# Patient Record
Sex: MALE | Race: BLACK OR AFRICAN AMERICAN | NOT HISPANIC OR LATINO | Employment: UNEMPLOYED | URBAN - METROPOLITAN AREA
[De-identification: names, ages, dates, MRNs, and addresses within clinical notes are randomized per-mention and may not be internally consistent; named-entity substitution may affect disease eponyms.]

---

## 2019-03-21 ENCOUNTER — HOSPITAL ENCOUNTER (EMERGENCY)
Facility: HOSPITAL | Age: 59
Discharge: HOME | End: 2019-03-21
Attending: EMERGENCY MEDICINE | Admitting: EMERGENCY MEDICINE

## 2019-03-21 ENCOUNTER — APPOINTMENT (OUTPATIENT)
Dept: GENERAL RADIOLOGY | Facility: HOSPITAL | Age: 59
End: 2019-03-21

## 2019-03-21 VITALS
BODY MASS INDEX: 23.8 KG/M2 | TEMPERATURE: 98.8 F | DIASTOLIC BLOOD PRESSURE: 70 MMHG | SYSTOLIC BLOOD PRESSURE: 124 MMHG | HEART RATE: 80 BPM | RESPIRATION RATE: 18 BRPM | WEIGHT: 170 LBS | OXYGEN SATURATION: 98 % | HEIGHT: 71 IN

## 2019-03-21 DIAGNOSIS — Z86.79 HISTORY OF HYPERTENSION: ICD-10-CM

## 2019-03-21 DIAGNOSIS — I50.9 CONGESTIVE HEART FAILURE, UNSPECIFIED HF CHRONICITY, UNSPECIFIED HEART FAILURE TYPE (HCC): Primary | ICD-10-CM

## 2019-03-21 LAB
ALBUMIN SERPL-MCNC: 3.9 G/DL (ref 3.2–4.8)
ALBUMIN/GLOB SERPL: 1.3 G/DL (ref 1.5–2.5)
ALP SERPL-CCNC: 80 U/L (ref 25–100)
ALT SERPL W P-5'-P-CCNC: 35 U/L (ref 7–40)
ANION GAP SERPL CALCULATED.3IONS-SCNC: 8 MMOL/L (ref 3–11)
AST SERPL-CCNC: 26 U/L (ref 0–33)
BASOPHILS # BLD AUTO: 0.02 10*3/MM3 (ref 0–0.2)
BASOPHILS NFR BLD AUTO: 0.3 % (ref 0–1)
BILIRUB SERPL-MCNC: 1.4 MG/DL (ref 0.3–1.2)
BNP SERPL-MCNC: 675 PG/ML (ref 0–100)
BUN BLD-MCNC: 14 MG/DL (ref 9–23)
BUN/CREAT SERPL: 11.7 (ref 7–25)
CALCIUM SPEC-SCNC: 8.9 MG/DL (ref 8.7–10.4)
CHLORIDE SERPL-SCNC: 111 MMOL/L (ref 99–109)
CO2 SERPL-SCNC: 21 MMOL/L (ref 20–31)
CREAT BLD-MCNC: 1.2 MG/DL (ref 0.6–1.3)
D DIMER PPP FEU-MCNC: 0.48 MCGFEU/ML (ref 0–0.56)
D-LACTATE SERPL-SCNC: 1.1 MMOL/L (ref 0.5–2)
DEPRECATED RDW RBC AUTO: 49.8 FL (ref 37–54)
EOSINOPHIL # BLD AUTO: 0.28 10*3/MM3 (ref 0–0.3)
EOSINOPHIL NFR BLD AUTO: 4.1 % (ref 0–3)
ERYTHROCYTE [DISTWIDTH] IN BLOOD BY AUTOMATED COUNT: 15.2 % (ref 11.3–14.5)
GFR SERPL CREATININE-BSD FRML MDRD: 76 ML/MIN/1.73
GLOBULIN UR ELPH-MCNC: 2.9 GM/DL
GLUCOSE BLD-MCNC: 95 MG/DL (ref 70–100)
HCT VFR BLD AUTO: 38 % (ref 38.9–50.9)
HGB BLD-MCNC: 12.6 G/DL (ref 13.1–17.5)
HOLD SPECIMEN: NORMAL
HOLD SPECIMEN: NORMAL
IMM GRANULOCYTES # BLD AUTO: 0.01 10*3/MM3 (ref 0–0.05)
IMM GRANULOCYTES NFR BLD AUTO: 0.1 % (ref 0–0.6)
LYMPHOCYTES # BLD AUTO: 1.45 10*3/MM3 (ref 0.6–4.8)
LYMPHOCYTES NFR BLD AUTO: 21.4 % (ref 24–44)
MCH RBC QN AUTO: 29.8 PG (ref 27–31)
MCHC RBC AUTO-ENTMCNC: 33.2 G/DL (ref 32–36)
MCV RBC AUTO: 89.8 FL (ref 80–99)
MONOCYTES # BLD AUTO: 0.43 10*3/MM3 (ref 0–1)
MONOCYTES NFR BLD AUTO: 6.3 % (ref 0–12)
NEUTROPHILS # BLD AUTO: 4.61 10*3/MM3 (ref 1.5–8.3)
NEUTROPHILS NFR BLD AUTO: 67.9 % (ref 41–71)
PLATELET # BLD AUTO: 261 10*3/MM3 (ref 150–450)
PMV BLD AUTO: 10.8 FL (ref 6–12)
POTASSIUM BLD-SCNC: 4.3 MMOL/L (ref 3.5–5.5)
PROT SERPL-MCNC: 6.8 G/DL (ref 5.7–8.2)
RBC # BLD AUTO: 4.23 10*6/MM3 (ref 4.2–5.76)
SODIUM BLD-SCNC: 140 MMOL/L (ref 132–146)
TROPONIN I SERPL-MCNC: 0.02 NG/ML (ref 0–0.07)
TROPONIN I SERPL-MCNC: 0.05 NG/ML (ref 0–0.07)
WBC NRBC COR # BLD: 6.79 10*3/MM3 (ref 3.5–10.8)
WHOLE BLOOD HOLD SPECIMEN: NORMAL
WHOLE BLOOD HOLD SPECIMEN: NORMAL

## 2019-03-21 PROCEDURE — 85025 COMPLETE CBC W/AUTO DIFF WBC: CPT | Performed by: EMERGENCY MEDICINE

## 2019-03-21 PROCEDURE — 87040 BLOOD CULTURE FOR BACTERIA: CPT | Performed by: NURSE PRACTITIONER

## 2019-03-21 PROCEDURE — 93005 ELECTROCARDIOGRAM TRACING: CPT | Performed by: EMERGENCY MEDICINE

## 2019-03-21 PROCEDURE — 83605 ASSAY OF LACTIC ACID: CPT | Performed by: NURSE PRACTITIONER

## 2019-03-21 PROCEDURE — 71045 X-RAY EXAM CHEST 1 VIEW: CPT

## 2019-03-21 PROCEDURE — 85379 FIBRIN DEGRADATION QUANT: CPT | Performed by: NURSE PRACTITIONER

## 2019-03-21 PROCEDURE — 83880 ASSAY OF NATRIURETIC PEPTIDE: CPT | Performed by: EMERGENCY MEDICINE

## 2019-03-21 PROCEDURE — 99284 EMERGENCY DEPT VISIT MOD MDM: CPT

## 2019-03-21 PROCEDURE — 25010000002 FUROSEMIDE PER 20 MG: Performed by: NURSE PRACTITIONER

## 2019-03-21 PROCEDURE — 96374 THER/PROPH/DIAG INJ IV PUSH: CPT

## 2019-03-21 PROCEDURE — 84484 ASSAY OF TROPONIN QUANT: CPT

## 2019-03-21 PROCEDURE — 80053 COMPREHEN METABOLIC PANEL: CPT | Performed by: EMERGENCY MEDICINE

## 2019-03-21 RX ORDER — ATORVASTATIN CALCIUM 20 MG/1
20 TABLET, FILM COATED ORAL DAILY
COMMUNITY

## 2019-03-21 RX ORDER — CARVEDILOL 6.25 MG/1
6.25 TABLET ORAL 2 TIMES DAILY WITH MEALS
COMMUNITY

## 2019-03-21 RX ORDER — FUROSEMIDE 10 MG/ML
40 INJECTION INTRAMUSCULAR; INTRAVENOUS ONCE
Status: COMPLETED | OUTPATIENT
Start: 2019-03-21 | End: 2019-03-21

## 2019-03-21 RX ORDER — ASPIRIN 81 MG/1
81 TABLET ORAL DAILY
COMMUNITY

## 2019-03-21 RX ORDER — LEVETIRACETAM 500 MG/1
500 TABLET ORAL 2 TIMES DAILY
COMMUNITY

## 2019-03-21 RX ORDER — SPIRONOLACTONE 25 MG/1
25 TABLET ORAL DAILY
COMMUNITY

## 2019-03-21 RX ORDER — LISINOPRIL 2.5 MG/1
2.5 TABLET ORAL DAILY
COMMUNITY

## 2019-03-21 RX ORDER — FUROSEMIDE 40 MG/1
40 TABLET ORAL 2 TIMES DAILY
COMMUNITY

## 2019-03-21 RX ORDER — ISOSORBIDE MONONITRATE 30 MG/1
30 TABLET, EXTENDED RELEASE ORAL DAILY
COMMUNITY

## 2019-03-21 RX ORDER — SODIUM CHLORIDE 0.9 % (FLUSH) 0.9 %
10 SYRINGE (ML) INJECTION AS NEEDED
Status: DISCONTINUED | OUTPATIENT
Start: 2019-03-21 | End: 2019-03-21 | Stop reason: HOSPADM

## 2019-03-21 RX ADMIN — FUROSEMIDE 40 MG: 10 INJECTION, SOLUTION INTRAMUSCULAR; INTRAVENOUS at 14:42

## 2019-03-21 NOTE — ED PROVIDER NOTES
Subjective   William Soler is a 58 y.o.male who presents to the ED by EMS with c/o dyspnea that worsened last night. He states that he suffered an stroke on 2/7/19 and was admitted to [facility]. He is not sure how he was treated but states that he was not receiving the answers he wanted during his admission. He was told that he had fluid on his lungs during his admission but denies the diagnosis of PNA. He denies a h/o CAD, DVT or PE. He states that he has leaky heart valves but denies a h/o IVDU and states that he has an AICD that was inserted 3 years ago but denies it if to have shocked him. He states that he has been coughing miguel but only has a mild cough and does not report any sputum production. His dyspnea worsened last night and he was unable to obtain restful sleep last night since laying down significantly worsened his dyspnea. This morning he took all of his medication including his lasix and anti-hypertensive medication then called EMS for his worsening dyspnea and en route he received an albuterol treatment that provided him with mild relief. He denies any chest pain, abdominal pain, lower extremity edema or any additional acute complaints at this time. He has a h/o HLD.         History provided by:  Patient  Shortness of Breath   Severity:  Moderate  Onset quality:  Gradual  Duration:  1 week  Timing:  Constant  Progression:  Worsening  Chronicity:  New  Relieved by:  Inhaler and sitting up  Worsened by:  Coughing (laying down)  Ineffective treatments:  Rest  Associated symptoms: cough    Associated symptoms: no abdominal pain, no chest pain, no diaphoresis, no fever and no vomiting    Risk factors: no hx of PE/DVT        Review of Systems   Constitutional: Negative for diaphoresis and fever.   Respiratory: Positive for cough and shortness of breath.    Cardiovascular: Negative for chest pain and leg swelling.   Gastrointestinal: Negative for abdominal pain, diarrhea, nausea and vomiting.   All other  systems reviewed and are negative.      Past Medical History:   Diagnosis Date   • Dyspnea    • Hyperlipidemia    • Hypertension    • Stroke (CMS/HCC)        Allergies   Allergen Reactions   • Naproxen Hives       Past Surgical History:   Procedure Laterality Date   • CARDIAC DEFIBRILLATOR PLACEMENT     • PACEMAKER IMPLANTATION         No family history on file.    Social History     Socioeconomic History   • Marital status: Single     Spouse name: Not on file   • Number of children: Not on file   • Years of education: Not on file   • Highest education level: Not on file   Tobacco Use   • Smoking status: Former Smoker   • Smokeless tobacco: Never Used   Substance and Sexual Activity   • Alcohol use: No     Frequency: Never         Objective   Physical Exam   Constitutional: He is oriented to person, place, and time. He appears well-developed and well-nourished. No distress.   Maintaining normal oxygen saturations on room air.    HENT:   Head: Normocephalic and atraumatic.   Right Ear: External ear normal.   Left Ear: External ear normal.   Nose: Nose normal.   Eyes: Conjunctivae are normal. No scleral icterus.   Neck: Normal range of motion. Neck supple. No JVD present.   Cardiovascular: Normal rate, regular rhythm and normal heart sounds. Exam reveals no friction rub.   No murmur heard.  Pulmonary/Chest: Effort normal. Tachypnea noted. No respiratory distress. He has no wheezes. He has rales.   Tachypneic, rales bilaterally. No wheezing.   Abdominal: Soft. Bowel sounds are normal. He exhibits no distension. There is no tenderness.   Musculoskeletal: Normal range of motion. He exhibits no edema or tenderness.   No pedal.    Neurological: He is alert and oriented to person, place, and time.   Skin: Skin is warm and dry. He is not diaphoretic.   Psychiatric: He has a normal mood and affect. His behavior is normal.   Nursing note and vitals reviewed.      Procedures         ED Course  ED Course as of Mar 21 1443   Thu  Mar 21, 2019   1311 BNP: (!) 675.0 [ML]   1427 I have conferred with Dr. Sommers.  Patient has mild congestive failure.  On room air, he maintains high 90s oxygen saturations.  He is not tachycardic.  He is normotensive.  He has slight tachypnea.  I am going to give him some Lasix.  It is noted that he has been prescribed Coreg about 2 weeks ago and has not begun taking the medication yet.  He has had 2 normal troponins.  Lactate, white count, review of system and physical exam is inconsistent with infectious process.  We will have him seen in the ambulatory cardiac clinic in the morning.  [ML]      ED Course User Index  [ML] Rebecca Lobo, MADELYN     Recent Results (from the past 24 hour(s))   POC Troponin, Rapid    Collection Time: 03/21/19 11:35 AM   Result Value Ref Range    Troponin I 0.05 0.00 - 0.07 ng/mL   Comprehensive Metabolic Panel    Collection Time: 03/21/19 11:39 AM   Result Value Ref Range    Glucose 95 70 - 100 mg/dL    BUN 14 9 - 23 mg/dL    Creatinine 1.20 0.60 - 1.30 mg/dL    Sodium 140 132 - 146 mmol/L    Potassium 4.3 3.5 - 5.5 mmol/L    Chloride 111 (H) 99 - 109 mmol/L    CO2 21.0 20.0 - 31.0 mmol/L    Calcium 8.9 8.7 - 10.4 mg/dL    Total Protein 6.8 5.7 - 8.2 g/dL    Albumin 3.90 3.20 - 4.80 g/dL    ALT (SGPT) 35 7 - 40 U/L    AST (SGOT) 26 0 - 33 U/L    Alkaline Phosphatase 80 25 - 100 U/L    Total Bilirubin 1.4 (H) 0.3 - 1.2 mg/dL    eGFR  African Amer 76 >60 mL/min/1.73    Globulin 2.9 gm/dL    A/G Ratio 1.3 (L) 1.5 - 2.5 g/dL    BUN/Creatinine Ratio 11.7 7.0 - 25.0    Anion Gap 8.0 3.0 - 11.0 mmol/L   BNP    Collection Time: 03/21/19 11:39 AM   Result Value Ref Range    .0 (H) 0.0 - 100.0 pg/mL   Light Blue Top    Collection Time: 03/21/19 11:39 AM   Result Value Ref Range    Extra Tube hold for add-on    Green Top (Gel)    Collection Time: 03/21/19 11:39 AM   Result Value Ref Range    Extra Tube Hold for add-ons.    Lavender Top    Collection Time: 03/21/19 11:39 AM   Result  Value Ref Range    Extra Tube hold for add-on    Gold Top - SST    Collection Time: 03/21/19 11:39 AM   Result Value Ref Range    Extra Tube Hold for add-ons.    CBC Auto Differential    Collection Time: 03/21/19 11:39 AM   Result Value Ref Range    WBC 6.79 3.50 - 10.80 10*3/mm3    RBC 4.23 4.20 - 5.76 10*6/mm3    Hemoglobin 12.6 (L) 13.1 - 17.5 g/dL    Hematocrit 38.0 (L) 38.9 - 50.9 %    MCV 89.8 80.0 - 99.0 fL    MCH 29.8 27.0 - 31.0 pg    MCHC 33.2 32.0 - 36.0 g/dL    RDW 15.2 (H) 11.3 - 14.5 %    RDW-SD 49.8 37.0 - 54.0 fl    MPV 10.8 6.0 - 12.0 fL    Platelets 261 150 - 450 10*3/mm3    Neutrophil % 67.9 41.0 - 71.0 %    Lymphocyte % 21.4 (L) 24.0 - 44.0 %    Monocyte % 6.3 0.0 - 12.0 %    Eosinophil % 4.1 (H) 0.0 - 3.0 %    Basophil % 0.3 0.0 - 1.0 %    Immature Grans % 0.1 0.0 - 0.6 %    Neutrophils, Absolute 4.61 1.50 - 8.30 10*3/mm3    Lymphocytes, Absolute 1.45 0.60 - 4.80 10*3/mm3    Monocytes, Absolute 0.43 0.00 - 1.00 10*3/mm3    Eosinophils, Absolute 0.28 0.00 - 0.30 10*3/mm3    Basophils, Absolute 0.02 0.00 - 0.20 10*3/mm3    Immature Grans, Absolute 0.01 0.00 - 0.05 10*3/mm3   Lactic Acid, Plasma    Collection Time: 03/21/19 11:39 AM   Result Value Ref Range    Lactate 1.1 0.5 - 2.0 mmol/L   D-dimer, Quantitative    Collection Time: 03/21/19 11:39 AM   Result Value Ref Range    D-Dimer, Quantitative 0.48 0.00 - 0.56 MCGFEU/mL   POC Troponin, Rapid    Collection Time: 03/21/19  2:14 PM   Result Value Ref Range    Troponin I 0.02 0.00 - 0.07 ng/mL     Note: In addition to lab results from this visit, the labs listed above may include labs taken at another facility or during a different encounter within the last 24 hours. Please correlate lab times with ED admission and discharge times for further clarification of the services performed during this visit.    XR Chest 1 View   Preliminary Result   There is patchy airspace disease in the right lower lobe and   there is mild central vascular congestion.  It is difficult to   differentiate congestive heart failure versus a patchy right lower lobe   pneumonia.       D:  03/21/2019   E:  03/21/2019                Vitals:    03/21/19 1400 03/21/19 1401 03/21/19 1430 03/21/19 1442   BP: 137/98  122/57 122/57   Pulse: 83 88 93 92   Resp:       Temp:       SpO2: 98% 95% 95%    Weight:       Height:         Medications   sodium chloride 0.9 % flush 10 mL (not administered)   furosemide (LASIX) injection 40 mg (40 mg Intravenous Given 3/21/19 1442)     ECG/EMG Results (last 24 hours)     Procedure Component Value Units Date/Time    ECG 12 Lead [[id number]] Collected:  03/21/19 1117     Updated:  03/21/19 1119        ECG 12 Lead         ECG 12 Lead                             MDM    Final diagnoses:   Congestive heart failure, unspecified HF chronicity, unspecified heart failure type (CMS/HCC)   History of hypertension       Documentation assistance provided by anjana Ross.  Information recorded by the scribe was done at my direction and has been verified and validated by me.     Gilles Ross  03/21/19 1220       Rebecca Lobo APRN  03/21/19 1445

## 2019-03-21 NOTE — DISCHARGE INSTRUCTIONS
Take your blood pressure and fluid pills as directed.  Anticipate a phone call from the outpatient cardiac clinic in the morning where they will invite you to return to campus with instructions for further evaluation of the fluid around your heart.  Thank you

## 2019-03-26 LAB
BACTERIA SPEC AEROBE CULT: NORMAL
BACTERIA SPEC AEROBE CULT: NORMAL